# Patient Record
Sex: MALE | Race: WHITE | Employment: FULL TIME | URBAN - METROPOLITAN AREA
[De-identification: names, ages, dates, MRNs, and addresses within clinical notes are randomized per-mention and may not be internally consistent; named-entity substitution may affect disease eponyms.]

---

## 2018-09-07 ENCOUNTER — OFFICE VISIT (OUTPATIENT)
Dept: FAMILY MEDICINE | Facility: CLINIC | Age: 48
End: 2018-09-07
Payer: COMMERCIAL

## 2018-09-07 VITALS
SYSTOLIC BLOOD PRESSURE: 107 MMHG | RESPIRATION RATE: 18 BRPM | HEIGHT: 69 IN | WEIGHT: 207.6 LBS | HEART RATE: 50 BPM | BODY MASS INDEX: 30.75 KG/M2 | DIASTOLIC BLOOD PRESSURE: 70 MMHG | OXYGEN SATURATION: 98 % | TEMPERATURE: 97.5 F

## 2018-09-07 DIAGNOSIS — Z13.6 CARDIOVASCULAR SCREENING; LDL GOAL LESS THAN 160: ICD-10-CM

## 2018-09-07 DIAGNOSIS — Z00.00 ROUTINE GENERAL MEDICAL EXAMINATION AT A HEALTH CARE FACILITY: Primary | ICD-10-CM

## 2018-09-07 PROCEDURE — 36415 COLL VENOUS BLD VENIPUNCTURE: CPT | Performed by: NURSE PRACTITIONER

## 2018-09-07 PROCEDURE — 80048 BASIC METABOLIC PNL TOTAL CA: CPT | Performed by: NURSE PRACTITIONER

## 2018-09-07 PROCEDURE — 99386 PREV VISIT NEW AGE 40-64: CPT | Performed by: NURSE PRACTITIONER

## 2018-09-07 PROCEDURE — 80061 LIPID PANEL: CPT | Performed by: NURSE PRACTITIONER

## 2018-09-07 NOTE — MR AVS SNAPSHOT
After Visit Summary   9/7/2018    Clinton Jim    MRN: [id number]           Patient Information     Date Of Birth          1970        Visit Information        Provider Department      9/7/2018 1:00 PM Elodia Rogel APRN [facility]        Today's Diagnoses     Routine general medical examination at a health care facility    -  1    CARDIOVASCULAR SCREENING; LDL GOAL LESS THAN 160          Care Instructions      Preventive Health Recommendations  Male Ages 40 to 49    Yearly exam:             See your health care provider every year in order to  o   Review health changes.   o   Discuss preventive care.    o   Review your medicines if your doctor has prescribed any.    You should be tested each year for STDs (sexually transmitted diseases) if you re at risk.     Have a cholesterol test every 5 years.     Have a colonoscopy (test for colon cancer) if someone in your family has had colon cancer or polyps before age 50.     After age 45, have a diabetes test (fasting glucose). If you are at risk for diabetes, you should have this test every 3 years.      Talk with your health care provider about whether or not a prostate cancer screening test (PSA) is right for you.    Shots: Get a flu shot each year. Get a tetanus shot every 10 years.     Nutrition:    Eat at least 5 servings of fruits and vegetables daily.     Eat whole-grain bread, whole-wheat pasta and brown rice instead of white grains and rice.     Get adequate Calcium and Vitamin D.     Lifestyle    Exercise for at least 150 minutes a week (30 minutes a day, 5 days a week). This will help you control your weight and prevent disease.     Limit alcohol to one drink per day.     No smoking.     Wear sunscreen to prevent skin cancer.     See your dentist every six months for an exam and cleaning.        Preventive Health Recommendations  Male Ages 40 to 49    Yearly exam:             See your health care provider every  year in order to  o   Review health changes.   o   Discuss preventive care.    o   Review your medicines if your doctor has prescribed any.    You should be tested each year for STDs (sexually transmitted diseases) if you re at risk.     Have a cholesterol test every 5 years.     Have a colonoscopy (test for colon cancer) if someone in your family has had colon cancer or polyps before age 50.     After age 45, have a diabetes test (fasting glucose). If you are at risk for diabetes, you should have this test every 3 years.      Talk with your health care provider about whether or not a prostate cancer screening test (PSA) is right for you.    Shots: Get a flu shot each year. Get a tetanus shot every 10 years.     Nutrition:    Eat at least 5 servings of fruits and vegetables daily.     Eat whole-grain bread, whole-wheat pasta and brown rice instead of white grains and rice.     Get adequate Calcium and Vitamin D.     Lifestyle    Exercise for at least 150 minutes a week (30 minutes a day, 5 days a week). This will help you control your weight and prevent disease.     Limit alcohol to one drink per day.     No smoking.     Wear sunscreen to prevent skin cancer.     See your dentist every six months for an exam and cleaning.              Follow-ups after your visit        Follow-up notes from your care team     Return in about 1 year (around 9/7/2019) for Physical Exam.      Who to contact     If you have questions or need follow up information about today's clinic visit or your schedule please contact [facility] directly at [phone].  Normal or non-critical lab and imaging results will be communicated to you by MyChart, letter or phone within 4 business days after the clinic has received the results. If you do not hear from us within 7 days, please contact the clinic through MyChart or phone. If you have a critical or abnormal lab result, we will notify you by phone as soon as possible.  Submit  "refill requests through [employer] or call your pharmacy and they will forward the refill request to us. Please allow 3 business days for your refill to be completed.          Additional Information About Your Visit        [employer] Information     [employer] gives you secure access to your electronic health record. If you see a primary care provider, you can also send messages to your care team and make appointments. If you have questions, please call your primary care clinic.  If you do not have a primary care provider, please call [phone] and they will assist you.        Care EveryWhere ID     This is your Care EveryWhere ID. This could be used by other organizations to access your [facility] medical records  [id number]        Your Vitals Were     Pulse Temperature Respirations Height Pulse Oximetry BMI (Body Mass Index)    50 97.5  F (36.4  C) (Oral) 18 5' 8.5\" (1.74 m) 98% 31.11 kg/m2       Blood Pressure from Last 3 Encounters:   09/07/18 107/70   03/12/14 108/76   10/10/11 112/70    Weight from Last 3 Encounters:   09/07/18 207 lb 9.6 oz (94.2 kg)   03/12/14 213 lb (96.6 kg)   10/10/11 205 lb 4.8 oz (93.1 kg)              We Performed the Following     Basic metabolic panel     Lipid panel reflex to direct LDL Fasting        Primary Care Provider Office Phone # Fax #    Case Marco A Howell [phone]       [phone]  [facility] [id number]        Equal Access to Services     [facility]: Hadii aad ku hadasho Soomaali, waaxda luqadaha, qaybta kaalmada billyegyada, bernarda moon . So [facility] [phone].    ATENCIÓN: Si habla español, tiene a galvan disposición servicios gratuitos de asistencia lingüística. Llame al [phone].    We comply with applicable federal civil rights laws and [place] laws. We do not discriminate on the basis of race, color, national origin, age, disability, sex, sexual orientation, or gender identity.            Thank you!     Thank you " for choosing [facility]  for your care. Our goal is always to provide you with excellent care. Hearing back from our patients is one way we can continue to improve our services. Please take a few minutes to complete the written survey that you may receive in the mail after your visit with us. Thank you!             Your Updated Medication List - Protect others around you: Learn how to safely use, store and throw away your medicines at www.disposemymeds.org.      Notice  As of 9/7/2018  1:24 PM    You have not been prescribed any medications.

## 2018-09-07 NOTE — PROGRESS NOTES
SUBJECTIVE:   CC: Clinton Jim is an 48 year old male who presents for preventative health visit.     Physical   Annual:     Getting at least 3 servings of Calcium per day:  Yes    Bi-annual eye exam:  Yes    Dental care twice a year:  Yes    Sleep apnea or symptoms of sleep apnea:  None    Diet:  Regular (no restrictions)    Frequency of exercise:  4-5 days/week    Duration of exercise:  45-60 minutes    Taking medications regularly:  Yes    Medication side effects:  None    Additional concerns today:  No      Overall doing well. No complaints or concerns about his health today.  Declines flu vaccine.       Today's PHQ-2 Score:   PHQ-2 ( 1999 Pfizer) 9/7/2018   Q1: Little interest or pleasure in doing things 0   Q2: Feeling down, depressed or hopeless 0   PHQ-2 Score 0   Q1: Little interest or pleasure in doing things Not at all   Q2: Feeling down, depressed or hopeless Not at all   PHQ-2 Score 0       Abuse: Current or Past(Physical, Sexual or Emotional)- No  Do you feel safe in your environment - Yes    Social History   Substance Use Topics     Smoking status: Never Smoker     Smokeless tobacco: Never Used     Alcohol use Yes      Comment: occasionally - couple/m     Alcohol Use 9/7/2018   If you drink alcohol do you typically have greater than 3 drinks per day OR greater than 7 drinks per week? No   No flowsheet data found.    Last PSA: No results found for: PSA    Reviewed orders with patient. Reviewed health maintenance and updated orders accordingly - Yes  BP Readings from Last 3 Encounters:   09/07/18 107/70   03/12/14 108/76   10/10/11 112/70    Wt Readings from Last 3 Encounters:   09/07/18 207 lb 9.6 oz (94.2 kg)   03/12/14 213 lb (96.6 kg)   10/10/11 205 lb 4.8 oz (93.1 kg)                  No current outpatient prescriptions on file.     Allergies   Allergen Reactions     No Known Allergies        Reviewed and updated as needed this visit by clinical staff  Tobacco  Allergies  Meds         Reviewed  "and updated as needed this visit by Provider        No past medical history on file.   No past surgical history on file.    Review of Systems  CONSTITUTIONAL: NEGATIVE for fever, chills, change in weight  INTEGUMENTARY/SKIN: NEGATIVE for worrisome rashes, moles or lesions  EYES: NEGATIVE for vision changes or irritation  ENT: NEGATIVE for ear, mouth and throat problems  RESP: NEGATIVE for significant cough or SOB  CV: NEGATIVE for chest pain, palpitations or peripheral edema  GI: NEGATIVE for nausea, abdominal pain, heartburn, or change in bowel habits   male: negative for dysuria, hematuria, decreased urinary stream, erectile dysfunction, urethral discharge  MUSCULOSKELETAL: NEGATIVE for significant arthralgias or myalgia  NEURO: NEGATIVE for weakness, dizziness or paresthesias  PSYCHIATRIC: NEGATIVE for changes in mood or affect    OBJECTIVE:   /70 (BP Location: Right arm, Patient Position: Chair, Cuff Size: Adult Regular)  Pulse 50  Temp 97.5  F (36.4  C) (Oral)  Resp 18  Ht 5' 8.5\" (1.74 m)  Wt 207 lb 9.6 oz (94.2 kg)  SpO2 98%  BMI 31.11 kg/m2    Physical Exam  GENERAL: healthy, alert and no distress  EYES: Eyes grossly normal to inspection, PERRL and conjunctivae and sclerae normal  HENT: ear canals and TM's normal, nose and mouth without ulcers or lesions  NECK: no adenopathy, no asymmetry, masses, or scars and thyroid normal to palpation  RESP: lungs clear to auscultation - no rales, rhonchi or wheezes  CV: regular rate and rhythm, normal S1 S2, no S3 or S4, no murmur, click or rub, no peripheral edema and peripheral pulses strong  ABDOMEN: soft, nontender, no hepatosplenomegaly, no masses and bowel sounds normal  MS: no gross musculoskeletal defects noted, no edema  SKIN: no suspicious lesions or rashes  NEURO: Normal strength and tone, mentation intact and speech normal  PSYCH: mentation appears normal, affect normal/bright    Diagnostic Test Results:  none     ASSESSMENT/PLAN:   1. Routine " "general medical examination at a health care facility  Normal exam today.  Will be due for colonoscopy next year.    - Lipid panel reflex to direct LDL Fasting  - Basic metabolic panel    2. CARDIOVASCULAR SCREENING; LDL GOAL LESS THAN 160  - Lipid panel reflex to direct LDL Fasting    COUNSELING:   Reviewed preventive health counseling, as reflected in patient instructions       Regular exercise       Healthy diet/nutrition    BP Readings from Last 1 Encounters:   09/07/18 107/70     Estimated body mass index is 31.11 kg/(m^2) as calculated from the following:    Height as of this encounter: 5' 8.5\" (1.74 m).    Weight as of this encounter: 207 lb 9.6 oz (94.2 kg).      Weight management plan: Discussed healthy diet and exercise guidelines and patient will follow up in 12 months in clinic to re-evaluate.     reports that he has never smoked. He has never used smokeless tobacco.      Counseling Resources:  ATP IV Guidelines  Pooled Cohorts Equation Calculator  FRAX Risk Assessment  ICSI Preventive Guidelines  Dietary Guidelines for Americans, 2010  USDA's MyPlate  ASA Prophylaxis  Lung CA Screening    [facility]  Answers for HPI/ROS submitted by the patient on 9/7/2018   PHQ-2 Score: 0    "

## 2018-09-07 NOTE — PROGRESS NOTES
"  SUBJECTIVE:   CC: Clinton Jim is an 48 year old male who presents for preventative health visit.     Healthy Habits:    Do you get at least three servings of calcium containing foods daily (dairy, green leafy vegetables, etc.)? {YES/NO, DAIRY INTAKE:578813::\"yes\"}    Amount of exercise or daily activities, outside of work: {AMOUNT EXERCISE:912930}    Problems taking medications regularly {Yes /No default:644759::\"No\"}    Medication side effects: {Yes /No default.:095108::\"No\"}    Have you had an eye exam in the past two years? {YESNOBLANK:785556}    Do you see a dentist twice per year? {YESNOBLANK:736174}    Do you have sleep apnea, excessive snoring or daytime drowsiness?{YESNOBLANK:003980}  {Outside tests to abstract? :449376}     {additional problems to add (Optional):942290}    Today's PHQ-2 Score:   PHQ-2 ( 1999 Pfizer) 3/12/2014 10/10/2011   Q1: Little interest or pleasure in doing things 0 0   Q2: Feeling down, depressed or hopeless 0 0   PHQ-2 Score 0 0     {PHQ-2 LOOK IN ASSESSMENTS :114712}  Abuse: Current or Past(Physical, Sexual or Emotional)- {YES/NO/NA:720439}  Do you feel safe in your environment - {YES/NO/NA:015620}    Social History   Substance Use Topics     Smoking status: Never Smoker     Smokeless tobacco: Never Used     Alcohol use Yes      Comment: occasionally - couple/m      If you drink alcohol do you typically have >3 drinks per day or >7 drinks per week? {ETOH :789692}                      Last PSA: No results found for: PSA    Reviewed orders with patient. Reviewed health maintenance and updated orders accordingly - {Yes/No:085209::\"Yes\"}  {Chronicprobdata (Optional):782634}    Reviewed and updated as needed this visit by clinical staff         Reviewed and updated as needed this visit by Provider        {HISTORY OPTIONS (Optional):161213}    ROS:  { :903191::\"CONSTITUTIONAL: NEGATIVE for fever, chills, change in weight\",\"INTEGUMENTARY/SKIN: NEGATIVE for worrisome rashes, moles or " "lesions\",\"EYES: NEGATIVE for vision changes or irritation\",\"ENT: NEGATIVE for ear, mouth and throat problems\",\"RESP: NEGATIVE for significant cough or SOB\",\"CV: NEGATIVE for chest pain, palpitations or peripheral edema\",\"GI: NEGATIVE for nausea, abdominal pain, heartburn, or change in bowel habits\",\" male: negative for dysuria, hematuria, decreased urinary stream, erectile dysfunction, urethral discharge\",\"MUSCULOSKELETAL: NEGATIVE for significant arthralgias or myalgia\",\"NEURO: NEGATIVE for weakness, dizziness or paresthesias\",\"PSYCHIATRIC: NEGATIVE for changes in mood or affect\"}    OBJECTIVE:   There were no vitals taken for this visit.  EXAM:  {Exam Choices:441945}    {Diagnostic Test Results (Optional):045222::\"Diagnostic Test Results:\",\"none \"}    ASSESSMENT/PLAN:   {Diag Picklist:685356}    COUNSELING:  {MALE COUNSELING MESSAGES:786310::\"Reviewed preventive health counseling, as reflected in patient instructions\"}    BP Readings from Last 1 Encounters:   03/12/14 108/76     Estimated body mass index is 31 kg/(m^2) as calculated from the following:    Height as of 3/12/14: 5' 9.5\" (1.765 m).    Weight as of 3/12/14: 213 lb (96.6 kg).    {BP Counseling- Complete if BP >= 120/80  (Optional):137861}  {Weight Management Plan (ACO) Complete if BMI is abnormal-  Ages 18-64  BMI >24.9.  Age 65+ with BMI <23 or >30 (Optional):224542}     reports that he has never smoked. He has never used smokeless tobacco.  {Tobacco Cessation -- Complete if patient is a smoker (Optional):051470}    Counseling Resources:  ATP IV Guidelines  Pooled Cohorts Equation Calculator  FRAX Risk Assessment  ICSI Preventive Guidelines  Dietary Guidelines for Americans, 2010  USDA's MyPlate  ASA Prophylaxis  Lung CA Screening    Elodia Rogel, [facility]  "

## 2018-09-08 LAB
ANION GAP SERPL CALCULATED.3IONS-SCNC: 11 MMOL/L (ref 3–14)
BUN SERPL-MCNC: 9 MG/DL (ref 7–30)
CALCIUM SERPL-MCNC: 8.8 MG/DL (ref 8.5–10.1)
CHLORIDE SERPL-SCNC: 106 MMOL/L (ref 94–109)
CHOLEST SERPL-MCNC: 173 MG/DL
CO2 SERPL-SCNC: 24 MMOL/L (ref 20–32)
CREAT SERPL-MCNC: 0.85 MG/DL (ref 0.66–1.25)
GFR SERPL CREATININE-BSD FRML MDRD: >90 ML/MIN/1.7M2
GLUCOSE SERPL-MCNC: 77 MG/DL (ref 70–99)
HDLC SERPL-MCNC: 58 MG/DL
LDLC SERPL CALC-MCNC: 105 MG/DL
NONHDLC SERPL-MCNC: 115 MG/DL
POTASSIUM SERPL-SCNC: 4.4 MMOL/L (ref 3.4–5.3)
SODIUM SERPL-SCNC: 141 MMOL/L (ref 133–144)
TRIGL SERPL-MCNC: 48 MG/DL

## 2019-09-26 ENCOUNTER — OFFICE VISIT (OUTPATIENT)
Dept: FAMILY MEDICINE | Facility: CLINIC | Age: 49
End: 2019-09-26
Payer: COMMERCIAL

## 2019-09-26 VITALS
SYSTOLIC BLOOD PRESSURE: 120 MMHG | WEIGHT: 206 LBS | DIASTOLIC BLOOD PRESSURE: 84 MMHG | RESPIRATION RATE: 12 BRPM | OXYGEN SATURATION: 99 % | HEART RATE: 52 BPM | BODY MASS INDEX: 30.51 KG/M2 | TEMPERATURE: 97.7 F | HEIGHT: 69 IN

## 2019-09-26 DIAGNOSIS — M25.512 ACUTE PAIN OF LEFT SHOULDER: ICD-10-CM

## 2019-09-26 DIAGNOSIS — Z23 NEED FOR PROPHYLACTIC VACCINATION AND INOCULATION AGAINST INFLUENZA: ICD-10-CM

## 2019-09-26 DIAGNOSIS — Z23 NEED FOR TDAP VACCINATION: ICD-10-CM

## 2019-09-26 DIAGNOSIS — Z11.3 SCREEN FOR STD (SEXUALLY TRANSMITTED DISEASE): ICD-10-CM

## 2019-09-26 DIAGNOSIS — Z00.00 ROUTINE GENERAL MEDICAL EXAMINATION AT A HEALTH CARE FACILITY: Primary | ICD-10-CM

## 2019-09-26 DIAGNOSIS — Z12.11 SPECIAL SCREENING FOR MALIGNANT NEOPLASMS, COLON: ICD-10-CM

## 2019-09-26 PROCEDURE — 87591 N.GONORRHOEAE DNA AMP PROB: CPT | Performed by: FAMILY MEDICINE

## 2019-09-26 PROCEDURE — 90715 TDAP VACCINE 7 YRS/> IM: CPT | Performed by: FAMILY MEDICINE

## 2019-09-26 PROCEDURE — 80048 BASIC METABOLIC PNL TOTAL CA: CPT | Performed by: FAMILY MEDICINE

## 2019-09-26 PROCEDURE — 80061 LIPID PANEL: CPT | Performed by: FAMILY MEDICINE

## 2019-09-26 PROCEDURE — 87389 HIV-1 AG W/HIV-1&-2 AB AG IA: CPT | Performed by: FAMILY MEDICINE

## 2019-09-26 PROCEDURE — 36415 COLL VENOUS BLD VENIPUNCTURE: CPT | Performed by: FAMILY MEDICINE

## 2019-09-26 PROCEDURE — 90471 IMMUNIZATION ADMIN: CPT | Performed by: FAMILY MEDICINE

## 2019-09-26 PROCEDURE — 99396 PREV VISIT EST AGE 40-64: CPT | Mod: 25 | Performed by: FAMILY MEDICINE

## 2019-09-26 PROCEDURE — 86780 TREPONEMA PALLIDUM: CPT | Performed by: FAMILY MEDICINE

## 2019-09-26 PROCEDURE — 87491 CHLMYD TRACH DNA AMP PROBE: CPT | Performed by: FAMILY MEDICINE

## 2019-09-26 PROCEDURE — 90682 RIV4 VACC RECOMBINANT DNA IM: CPT | Performed by: FAMILY MEDICINE

## 2019-09-26 PROCEDURE — 90472 IMMUNIZATION ADMIN EACH ADD: CPT | Performed by: FAMILY MEDICINE

## 2019-09-26 ASSESSMENT — ENCOUNTER SYMPTOMS
DIARRHEA: 0
FEVER: 0
COUGH: 0
ARTHRALGIAS: 1
EYE PAIN: 0
DIZZINESS: 0
HEMATURIA: 0
FREQUENCY: 0
HEMATOCHEZIA: 0
CONSTIPATION: 0
NERVOUS/ANXIOUS: 0
ABDOMINAL PAIN: 0
CHILLS: 0

## 2019-09-26 ASSESSMENT — MIFFLIN-ST. JEOR: SCORE: 1776.85

## 2019-09-26 NOTE — PROGRESS NOTES
Prior to immunization administration, verified patients identity using patient s name and date of birth. Please see Immunization Activity for additional information.     Screening Questionnaire for Adult Immunization    Are you sick today?   No   Do you have allergies to medications, food, a vaccine component or latex?   No   Have you ever had a serious reaction after receiving a vaccination?   No   Do you have a long-term health problem with heart disease, lung disease, asthma, kidney disease, metabolic disease (e.g. diabetes), anemia, or other blood disorder?   No   Do you have cancer, leukemia, HIV/AIDS, or any other immune system problem?   No   In the past 3 months, have you taken medications that affect  your immune system, such as prednisone, other steroids, or anticancer drugs; drugs for the treatment of rheumatoid arthritis, Crohn s disease, or psoriasis; or have you had radiation treatments?   No   Have you had a seizure, or a brain or other nervous system problem?   No   During the past year, have you received a transfusion of blood or blood     products, or been given immune (gamma) globulin or antiviral drug?   No   For women: Are you pregnant or is there a chance you could become        pregnant during the next month?   No   Have you received any vaccinations in the past 4 weeks?   No     Immunization questionnaire answers were all negative.        Per orders of Dr. Howell, injection of Tdap and Flu given by Lisa A. Magill, CMA. Patient instructed to remain in clinic for 15 minutes afterwards, and to report any adverse reaction to me immediately.       Screening performed by Lisa A. Magill, CMA on 9/26/2019 at 9:33 AM.

## 2019-09-26 NOTE — PROGRESS NOTES
SUBJECTIVE:   CC: Clinton Jim is an 49 year old male who presents for preventative health visit.     Healthy Habits:     Getting at least 3 servings of Calcium per day:  Yes    Bi-annual eye exam:  Yes    Dental care twice a year:  Yes    Sleep apnea or symptoms of sleep apnea:  None    Diet:  Regular (no restrictions)    Frequency of exercise:  4-5 days/week    Barriers to taking medications:  None    Medication side effects:  None    PHQ-2 Total Score: 0    Additional concerns today:  No        Today's PHQ-2 Score:   PHQ-2 ( 1999 Pfizer) 9/26/2019   Q1: Little interest or pleasure in doing things 0   Q2: Feeling down, depressed or hopeless 0   PHQ-2 Score 0   Q1: Little interest or pleasure in doing things Not at all   Q2: Feeling down, depressed or hopeless Not at all   PHQ-2 Score 0       Abuse: Current or Past(Physical, Sexual or Emotional)- No  Do you feel safe in your environment? Yes    Social History     Tobacco Use     Smoking status: Never Smoker     Smokeless tobacco: Never Used   Substance Use Topics     Alcohol use: Yes     Comment: occasionally - couple/m       Alcohol Use 9/26/2019   Prescreen: >3 drinks/day or >7 drinks/week? No   Prescreen: >3 drinks/day or >7 drinks/week? -   No flowsheet data found.    Last PSA: No results found for: PSA    Reviewed orders with patient. Reviewed health maintenance and updated orders accordingly - Yes  Lab work is in process    Feeling well, no acute concerns.  Is fasting.  Would like STD testing, no sx but is currently dating.  Has been at least an hour since he urinated last.    Pain in L shoulder when bench pressing when he exercises.  Linked to an injury many years ago. Mostly better, really just this one movement that bothers him.  No pain in between.    Small skin tag on R neck.      Erection concerns.  Seems less robust, but functional.  Will still wake with an erection.    Reviewed and updated as needed this visit by clinical staff  Tobacco  Allergies   "Meds  Med Hx  Surg Hx  Fam Hx  Soc Hx        Reviewed and updated as needed this visit by Provider            Review of Systems   Musculoskeletal: Positive for arthralgias.     CONSTITUTIONAL: NEGATIVE for fever, chills, change in weight  INTEGUMENTARY/SKIN: NEGATIVE for worrisome rashes, moles or lesions  EYES: NEGATIVE for vision changes or irritation  ENT: NEGATIVE for ear, mouth and throat problems  RESP: NEGATIVE for significant cough or SOB  CV: NEGATIVE for chest pain, palpitations or peripheral edema  GI: NEGATIVE for nausea, abdominal pain, heartburn, or change in bowel habits   male: negative for dysuria, hematuria, decreased urinary stream, erectile dysfunction, urethral discharge  MUSCULOSKELETAL: NEGATIVE for significant arthralgias or myalgia  NEURO: NEGATIVE for weakness, dizziness or paresthesias  PSYCHIATRIC: NEGATIVE for changes in mood or affect    OBJECTIVE:   /84 (BP Location: Right arm, Patient Position: Chair, Cuff Size: Adult Large)   Pulse 52   Temp 97.7  F (36.5  C) (Oral)   Resp 12   Ht 1.74 m (5' 8.5\")   Wt 93.4 kg (206 lb)   SpO2 99%   BMI 30.87 kg/m      Physical Exam  Vitals signs and nursing note reviewed.   Constitutional:       Appearance: He is well-developed.   HENT:      Head: Normocephalic and atraumatic.      Right Ear: Tympanic membrane, ear canal and external ear normal.      Left Ear: Tympanic membrane, ear canal and external ear normal.   Eyes:      Pupils: Pupils are equal, round, and reactive to light.   Neck:      Thyroid: No thyromegaly.   Cardiovascular:      Rate and Rhythm: Normal rate and regular rhythm.      Heart sounds: Normal heart sounds.   Pulmonary:      Effort: Pulmonary effort is normal.      Breath sounds: Normal breath sounds.   Abdominal:      General: Bowel sounds are normal.      Palpations: Abdomen is soft. There is no mass.   Musculoskeletal: Normal range of motion.      Left shoulder: He exhibits normal range of motion, no " "tenderness, no crepitus and normal strength.   Lymphadenopathy:      Cervical: No cervical adenopathy.   Skin:     General: Skin is warm and dry.      Findings: No rash.   Neurological:      Mental Status: He is alert and oriented to person, place, and time.   Psychiatric:         Judgement: Judgment normal.           Diagnostic Test Results:  Labs reviewed in Epic    ASSESSMENT/PLAN:       ICD-10-CM    1. Routine general medical examination at a health care facility Z00.00 Lipid panel reflex to direct LDL Fasting     Basic metabolic panel   2. Screen for STD (sexually transmitted disease) Z11.3 NEISSERIA GONORRHOEA PCR     CHLAMYDIA TRACHOMATIS PCR     Treponema Abs w Reflex to RPR and Titer     HIV Antigen Antibody Combo   3. Special screening for malignant neoplasms, colon Z12.11 GASTROENTEROLOGY ADULT REF PROCEDURE ONLY Radha Arellano [phone]; [facility]   4. Acute pain of left shoulder M25.512 [facility] PT[facility] HAND, AND CHIROPRACTIC REFERRAL       COUNSELING:   Reviewed preventive health counseling, as reflected in patient instructions       Regular exercise       Vision screening    Estimated body mass index is 30.87 kg/m  as calculated from the following:    Height as of this encounter: 1.74 m (5' 8.5\").    Weight as of this encounter: 93.4 kg (206 lb).     Weight management plan: Discussed healthy diet and exercise guidelines     reports that he has never smoked. He has never used smokeless tobacco.      Counseling Resources:  ATP IV Guidelines  Pooled Cohorts Equation Calculator  FRAX Risk Assessment  ICSI Preventive Guidelines  Dietary Guidelines for Americans, 2010  USDA's MyPlate  ASA Prophylaxis  Lung CA Screening    Case Howell MD  [facility]  "

## 2019-09-27 ENCOUNTER — THERAPY VISIT (OUTPATIENT)
Dept: PHYSICAL THERAPY | Facility: CLINIC | Age: 49
End: 2019-09-27
Payer: COMMERCIAL

## 2019-09-27 ENCOUNTER — HOSPITAL ENCOUNTER (OUTPATIENT)
Facility: CLINIC | Age: 49
End: 2019-09-27
Attending: SURGERY | Admitting: SURGERY
Payer: COMMERCIAL

## 2019-09-27 DIAGNOSIS — M25.512 CHRONIC LEFT SHOULDER PAIN: ICD-10-CM

## 2019-09-27 DIAGNOSIS — M25.512 ACUTE PAIN OF LEFT SHOULDER: ICD-10-CM

## 2019-09-27 DIAGNOSIS — G89.29 CHRONIC LEFT SHOULDER PAIN: ICD-10-CM

## 2019-09-27 LAB
ANION GAP SERPL CALCULATED.3IONS-SCNC: 4 MMOL/L (ref 3–14)
BUN SERPL-MCNC: 11 MG/DL (ref 7–30)
C TRACH DNA SPEC QL NAA+PROBE: NEGATIVE
CALCIUM SERPL-MCNC: 9.1 MG/DL (ref 8.5–10.1)
CHLORIDE SERPL-SCNC: 104 MMOL/L (ref 94–109)
CHOLEST SERPL-MCNC: 197 MG/DL
CO2 SERPL-SCNC: 28 MMOL/L (ref 20–32)
CREAT SERPL-MCNC: 0.9 MG/DL (ref 0.66–1.25)
GFR SERPL CREATININE-BSD FRML MDRD: >90 ML/MIN/{1.73_M2}
GLUCOSE SERPL-MCNC: 85 MG/DL (ref 70–99)
HDLC SERPL-MCNC: 60 MG/DL
HIV 1+2 AB+HIV1 P24 AG SERPL QL IA: NONREACTIVE
LDLC SERPL CALC-MCNC: 127 MG/DL
N GONORRHOEA DNA SPEC QL NAA+PROBE: NEGATIVE
NONHDLC SERPL-MCNC: 137 MG/DL
POTASSIUM SERPL-SCNC: 4.5 MMOL/L (ref 3.4–5.3)
SODIUM SERPL-SCNC: 136 MMOL/L (ref 133–144)
SPECIMEN SOURCE: NORMAL
SPECIMEN SOURCE: NORMAL
T PALLIDUM AB SER QL: NONREACTIVE
TRIGL SERPL-MCNC: 49 MG/DL

## 2019-09-27 PROCEDURE — 97161 PT EVAL LOW COMPLEX 20 MIN: CPT | Mod: GP | Performed by: PHYSICAL THERAPIST

## 2019-09-27 PROCEDURE — 97110 THERAPEUTIC EXERCISES: CPT | Mod: GP | Performed by: PHYSICAL THERAPIST

## 2019-09-27 NOTE — PROGRESS NOTES
[facility] Initial Evaluation  Subjective:  The history is provided by the patient. No  was used.   Clinton Jim being seen for L shoulder pain.   Date of Onset: greater than one year ago. Where condition occurred: during recreation / sport.Problem occurred: lifting weights  and reported as 2/10 on pain scale. General health as reported by patient is good. Pertinent medical history includes:  None.  Medical allergies: none.  Surgeries include:  None.  Current medications:  None.   Primary job tasks include:  Computer work.  Pain is described as aching and is intermittent. Pain is worse during the day. Since onset symptoms are unchanged.   There was none improvement following previous treatment.   Patient is .   Barriers include:  None as reported by patient.  Red flags:  None as reported by patient.  Type of problem:  Left shoulder   Condition occurred with:  Lifting. This is a chronic condition   Problem details: Pt reports having left shoulder pain that has been present for greater than one year.  He attributes onset of sx's to lifting weights and performing low rep/high weight program.  Md appt on 9/26/19.  .   Patient reports pain:  In the joint and upper arm. Radiates to:  Shoulder. Associated symptoms:  Loss of motion/stiffness and loss of strength. Symptoms are exacerbated by using arm behind back and using arm overhead and relieved by rest.                      Objective:  Standing Alignment:      Shoulder/UE:  Rounded shoulders                  Flexibility/Screens:   Positive screens:  Shoulder                             Shoulder Evaluation:  ROM:  AROM:  normal                            PROM:  normal                                Strength:    Flexion: Left:4/5    Pain: -/+    Right: 5/5     Pain:   Extension:  Left: 5/5    Pain:    Right: 5/5    Pain:  Abduction:  Left: 4/5   Pain:-/+    Right: 4 and 5/5     Pain:    Internal Rotation:  Left:5/5      Pain:-     Right: 5/5      Pain:-  External Rotation:   Left:4/5      Pain:-/+   Right:5/5      Pain:-            Stability Testing:  normal      Special Tests:    Left shoulder positive for the following special tests:  Impingement  Left shoulder negative for the following special tests:  Labral and Rotator cuff tear    Right shoulder negative for the following special tests:Labral; Impingement and Rotator cuff tear  Palpation:  normal      Mobility Tests:  normal    Glenohumeral posterior left:  Hypomobile  Glenohumeral posterior right:  Normal                                             General     ROS    Assessment/Plan:    Patient is a 49 year old male with left side shoulder complaints.    Patient has the following significant findings with corresponding treatment plan.                Diagnosis 1:  L shoulder pain  Pain -  hot/cold therapy, self management, education, directional preference exercise and home program  Decreased strength - therapeutic exercise and therapeutic activities  Impaired muscle performance - neuro re-education  Decreased function - therapeutic activities    Therapy Evaluation Codes:   Previous and current functional limitations:  (See Goal Flow Sheet for this information)    Short term and Long term goals: (See Goal Flow Sheet for this information)     Communication ability:  Patient appears to be able to clearly communicate and understand verbal and written communication and follow directions correctly.  Treatment Explanation - The following has been discussed with the patient:   RX ordered/plan of care  Anticipated outcomes  Possible risks and side effects  This patient would benefit from PT intervention to resume normal activities.   Rehab potential is good.    Frequency:  1 X week, once daily  Duration:  for 6 weeks  Discharge Plan:  Achieve all LTG.  Independent in home treatment program.  Reach maximal therapeutic benefit.    Please refer to the daily flowsheet for treatment today, total  treatment time and time spent performing 1:1 timed codes.

## 2019-10-22 RX ORDER — ONDANSETRON 2 MG/ML
4 INJECTION INTRAMUSCULAR; INTRAVENOUS
Status: CANCELLED | OUTPATIENT
Start: 2019-10-22

## 2019-10-22 RX ORDER — LIDOCAINE 40 MG/G
CREAM TOPICAL
Status: CANCELLED | OUTPATIENT
Start: 2019-10-22

## 2019-10-31 PROBLEM — M25.512 CHRONIC LEFT SHOULDER PAIN: Status: RESOLVED | Noted: 2019-09-27 | Resolved: 2019-10-31

## 2019-10-31 PROBLEM — G89.29 CHRONIC LEFT SHOULDER PAIN: Status: RESOLVED | Noted: 2019-09-27 | Resolved: 2019-10-31

## 2019-11-21 ENCOUNTER — HOSPITAL ENCOUNTER (OUTPATIENT)
Facility: CLINIC | Age: 49
End: 2019-11-21
Attending: SURGERY | Admitting: SURGERY
Payer: COMMERCIAL

## 2019-12-18 RX ORDER — LIDOCAINE 40 MG/G
CREAM TOPICAL
Status: CANCELLED | OUTPATIENT
Start: 2019-12-18

## 2019-12-18 RX ORDER — ONDANSETRON 2 MG/ML
4 INJECTION INTRAMUSCULAR; INTRAVENOUS
Status: CANCELLED | OUTPATIENT
Start: 2019-12-18

## 2020-12-06 ENCOUNTER — HEALTH MAINTENANCE LETTER (OUTPATIENT)
Age: 50
End: 2020-12-06

## 2021-06-08 ENCOUNTER — OFFICE VISIT (OUTPATIENT)
Dept: FAMILY MEDICINE | Facility: CLINIC | Age: 51
End: 2021-06-08
Payer: COMMERCIAL

## 2021-06-08 VITALS
SYSTOLIC BLOOD PRESSURE: 126 MMHG | BODY MASS INDEX: 31.92 KG/M2 | HEIGHT: 68 IN | HEART RATE: 66 BPM | TEMPERATURE: 98.7 F | DIASTOLIC BLOOD PRESSURE: 66 MMHG | WEIGHT: 210.6 LBS | OXYGEN SATURATION: 96 %

## 2021-06-08 DIAGNOSIS — L91.8 SKIN TAG: ICD-10-CM

## 2021-06-08 DIAGNOSIS — L98.9 SKIN LESION: Primary | ICD-10-CM

## 2021-06-08 PROCEDURE — 99213 OFFICE O/P EST LOW 20 MIN: CPT | Performed by: FAMILY MEDICINE

## 2021-06-08 ASSESSMENT — MIFFLIN-ST. JEOR: SCORE: 1789.65

## 2021-06-08 NOTE — PROGRESS NOTES
"    Assessment & Plan     Skin lesion  Actinic Keratosis vs Squamous Cell on right temple.  Recommend biopsy.  Referral to Derm.  - ADULT DERMATOLOGY REFERRAL; Future    Skin tag  Pt will get removed when lesion on temple is removed.  - ADULT DERMATOLOGY REFERRAL; Future    7 minutes spent on the date of the encounter doing chart review, history and exam, documentation and further activities per the note     BMI:   Estimated body mass index is 31.73 kg/m  as calculated from the following:    Height as of this encounter: 1.735 m (5' 8.31\").    Weight as of this encounter: 95.5 kg (210 lb 9.6 oz).       See Patient Instructions    Return in about 3 months (around 9/8/2021) for Preventative Care Visit.    Linette Shafer MD  [facility] KALYANI Pace is a 50 year old who presents for the following health issues   History of Present Illness       He eats 2-3 servings of fruits and vegetables daily.He consumes 0 sweetened beverage(s) daily.He exercises with enough effort to increase his heart rate 60 or more minutes per day.  He exercises with enough effort to increase his heart rate 6 days per week.   He is taking medications regularly.       Skin tag   Onset/Duration: about 1 year  Description (location/number): 2  right side of head and neck  Accompanying signs and symptoms (pain, redness):  no   History: prior warts:  no   Therapies tried and outcome: none    Growth on right temple, present for about 6 months or so, catches glasses, unsure if it is getting bigger.  Small skin tag on the neck also, right side, catches on his collar. Would like to get removed if able.        Review of Systems   Constitutional, HEENT, cardiovascular, pulmonary, gi and gu systems are negative, except as otherwise noted.      Objective    /66   Pulse 66   Temp 98.7  F (37.1  C) (Oral)   Ht 1.735 m (5' 8.31\")   Wt 95.5 kg (210 lb 9.6 oz)   SpO2 96%   BMI 31.73 kg/m    Body mass index is " 31.73 kg/m .  Physical Exam   GENERAL: healthy, alert and no distress  SKIN: 1 mm skin tag on right lateral neck.  ~4 mm raised papule, dry flaky skin on top.

## 2021-07-15 ENCOUNTER — OFFICE VISIT (OUTPATIENT)
Dept: DERMATOLOGY | Facility: CLINIC | Age: 51
End: 2021-07-15
Payer: COMMERCIAL

## 2021-07-15 DIAGNOSIS — L30.4 INTERTRIGO: ICD-10-CM

## 2021-07-15 DIAGNOSIS — L91.8 SKIN TAG: ICD-10-CM

## 2021-07-15 DIAGNOSIS — L91.8 ACROCHORDON: ICD-10-CM

## 2021-07-15 DIAGNOSIS — L98.9 SKIN LESION: ICD-10-CM

## 2021-07-15 DIAGNOSIS — L82.1 SEBORRHEIC KERATOSES: ICD-10-CM

## 2021-07-15 DIAGNOSIS — B07.9 FILIFORM WART: Primary | ICD-10-CM

## 2021-07-15 PROCEDURE — 11200 RMVL SKIN TAGS UP TO&INC 15: CPT | Mod: XS

## 2021-07-15 PROCEDURE — 99202 OFFICE O/P NEW SF 15 MIN: CPT | Mod: 25

## 2021-07-15 PROCEDURE — 17110 DESTRUCTION B9 LES UP TO 14: CPT | Mod: GC

## 2021-07-15 RX ORDER — CLOTRIMAZOLE 1 %
CREAM (GRAM) TOPICAL 2 TIMES DAILY
Qty: 60 G | Refills: 3 | Status: SHIPPED | OUTPATIENT
Start: 2021-07-15

## 2021-07-15 ASSESSMENT — PAIN SCALES - GENERAL: PAINLEVEL: NO PAIN (0)

## 2021-07-15 NOTE — LETTER
7/15/2021     RE: Clinton Jim  [id number]  [place] [address]     Dear Colleague,    Thank you for referring your patient, Clinton Jim, to the [facility]MATOLOG[facility] [place] [facility]. Please see a copy of my visit note below.    [facility] Dermatology Note  Encounter Date: Jul 15, 2021  Office Visit     Dermatology Problem List:  # Filiform wart, right temple: s/p cryo 7/15  # Axillary rash ?intertrigo: empiric tx clotrimazole cream, woods lamp negative  ____________________________________________  Assessment & Plan:   #Skin tag, right neck and right flank, getting caught on clothing, s/p cryotherapy    #Filiform wart, right temple, s/p cryotherapy, f/u in 3 months    #Axillary rash bilateral: Noted on physical exam today as below, patient had not noticed. Differential includes intertrigo (no satellite lesions noted on exam) vs erythrasma (negative woods lamp) vs less likely inverse psoriasis. Will empirically treat with clotrimazole and f/u in 3 months.     #Seborrheic keratosis: Noted on exam, getting caught on clothing per patient, s/p cryotherapy     Procedures Performed:   - Cryotherapy procedure note, location(s): right neck, flank, right temple, left shoulder, and right calf. After verbal consent and discussion of risks and benefits including, but not limited to, dyspigmentation/scar, blister, and pain, 5 lesion(s) was(were) treated with 1-2 mm freeze border for 1-2 cycles with liquid nitrogen. Post cryotherapy instructions were provided.    Follow-up: 3 months, prn for new or changing lesions    Staff and Resident:   Sung Marks MD  PGY-2 Dermatology  Pager: [phone]    Patient was seen and examined with the dermatology resident. I agree with the history, review of systems, physical examination, assessments and plan. I was present for the entire cryotherapy procedure.    Dorcas Vitale MD  Professor and   Chair  Department of Dermatology  [facility]  ____________________________________________    CC: Derm Problem (pt state he is here to have two skin tags removed, right neck and right side of face. X 1 year)    HPI:  Mr. Clinton Jim is a(n) 51 year old male who presents today as a new patient for lesion(s) of concern.  - Lesion on right temple noticed approimately 1 year ago  - has increased in size, somewhat crusty, nontender, no bleeding  - skin tag on the right neck gets caught on clothing  - No family history of skin cancer  [place]  - Intermittent sunscreen use when outside  - Recalls blistering sunburns as a child   - Patient is otherwise feeling well, without additional skin concerns.    Labs Reviewed:  N/A    Physical Exam:  Vitals: There were no vitals taken for this visit.  SKIN: Focused examination of head, face, neck, trunk, and lower extremities was performed.  - Right temple with 4mm filiform, hyperkeratotic papule with vessels visualized on dermoscopy c/w filiform wart  - Right neck and right flank with tiny flesh colored pedunculated papules c/w acrochordons   - Left shoulder and right calf with waxy stuck on tan/brown papules  - bilateral axilla with well demarcated, brown/hyperpigmented colored plaques; no satellite lesions noted. Negative wood's lamp examination  - No other lesions of concern on areas examined.     Medications:  Current Outpatient Medications   Medication     clotrimazole (LOTRIMIN) 1 % external cream     No current facility-administered medications for this visit.      Past Medical History:   Patient Active Problem List   Diagnosis     CARDIOVASCULAR SCREENING; LDL GOAL LESS THAN 160     Seborrheic keratosis     No past medical history on file.    CC   April Sangita Shafer MD  [address] [place] [address] on close of this encounter.  Sung Marks MD

## 2021-07-15 NOTE — NURSING NOTE
Chief Complaint   Patient presents with     Derm Problem     pt state he is here to have two skin tags removed, right neck and right side of face. X 1 year     Claudia Nixon MA

## 2021-07-15 NOTE — PATIENT INSTRUCTIONS
- Clotrimazole to both armpits once daily     Cryotherapy    What is it?    Use of a very cold liquid, such as liquid nitrogen, to freeze and destroy abnormal skin cells that need to be removed    What should I expect?    Tenderness and redness    A small blister that might grow and fill with dark purple blood. There may be crusting.    More than one treatment may be needed if the lesions do not go away.    How do I care for the treated area?    Gently wash the area with your hands when bathing.    Use a thin layer of Vaseline to help with healing. You may use a Band-Aid.     The area should heal within 7-10 days and may leave behind a pink or lighter color.     Do not use an antibiotic or Neosporin ointment.     You may take acetaminophen (Tylenol) for pain.     Call your Doctor if you have:    Severe pain    Signs of infection (warmth, redness, cloudy yellow drainage, and or a bad smell)    Questions or concerns    Who should I call with questions?       [facility]: [phone]       [facility]: [phone]       For urgent needs outside of business hours call the [facility] at [phone]        and ask for the dermatology resident on call

## 2021-07-15 NOTE — PROGRESS NOTES
[facility] Dermatology Note  Encounter Date: Jul 15, 2021  Office Visit     Dermatology Problem List:  # Filiform wart, right temple: s/p cryo 7/15  # Axillary rash ?intertrigo: empiric tx clotrimazole cream, woods lamp negative  ____________________________________________    Assessment & Plan:   #Skin tag, right neck and right flank, getting caught on clothing, s/p cryotherapy    #Filiform wart, right temple, s/p cryotherapy, f/u in 3 months    #Axillary rash bilateral: Noted on physical exam today as below, patient had not noticed. Differential includes intertrigo (no satellite lesions noted on exam) vs erythrasma (negative woods lamp) vs less likely inverse psoriasis. Will empirically treat with clotrimazole and f/u in 3 months.     #Seborrheic keratosis: Noted on exam, getting caught on clothing per patient, s/p cryotherapy     Procedures Performed:   - Cryotherapy procedure note, location(s): right neck, flank, right temple, left shoulder, and right calf. After verbal consent and discussion of risks and benefits including, but not limited to, dyspigmentation/scar, blister, and pain, 5 lesion(s) was(were) treated with 1-2 mm freeze border for 1-2 cycles with liquid nitrogen. Post cryotherapy instructions were provided.    Follow-up: 3 months, prn for new or changing lesions    Staff and Resident:     Sung Marks MD  PGY-2 Dermatology  Pager: [phone]    Patient was seen and examined with the dermatology resident. I agree with the history, review of systems, physical examination, assessments and plan. I was present for the entire cryotherapy procedure.    Dorcas Vitale MD  Professor and  Chair  Department of Dermatology  [facility]    ____________________________________________    CC: Derm Problem (pt state he is here to have two skin tags removed, right neck and right side of face. X 1 year)    HPI:  Mr. Clinton Jim is a(n) 51 year old male who presents today as a new  patient for lesion(s) of concern.  - Lesion on right temple noticed approimately 1 year ago  - has increased in size, somewhat crusty, nontender, no bleeding  - skin tag on the right neck gets caught on clothing  - No family history of skin cancer  [place]  - Intermittent sunscreen use when outside  - Recalls blistering sunburns as a child   - Patient is otherwise feeling well, without additional skin concerns.    Labs Reviewed:  N/A    Physical Exam:  Vitals: There were no vitals taken for this visit.  SKIN: Focused examination of head, face, neck, trunk, and lower extremities was performed.  - Right temple with 4mm filiform, hyperkeratotic papule with vessels visualized on dermoscopy c/w filiform wart  - Right neck and right flank with tiny flesh colored pedunculated papules c/w acrochordons   - Left shoulder and right calf with waxy stuck on tan/brown papules  - bilateral axilla with well demarcated, brown/hyperpigmented colored plaques; no satellite lesions noted. Negative wood's lamp examination  - No other lesions of concern on areas examined.     Medications:  Current Outpatient Medications   Medication     clotrimazole (LOTRIMIN) 1 % external cream     No current facility-administered medications for this visit.      Past Medical History:   Patient Active Problem List   Diagnosis     CARDIOVASCULAR SCREENING; LDL GOAL LESS THAN 160     Seborrheic keratosis     No past medical history on file.    CC April Sangita Shafer MD  [address] [place] [address] on close of this encounter.

## 2021-09-26 ENCOUNTER — HEALTH MAINTENANCE LETTER (OUTPATIENT)
Age: 51
End: 2021-09-26

## 2021-10-25 NOTE — PROGRESS NOTES
[facility] Dermatology Note  Encounter Date: Oct 28, 2021  Office Visit     Dermatology Problem List:  # Filiform wart, right temple, resolved: s/p cryo 7/15  # Axillary rash, resolved  __________________________________________    Assessment & Plan:   # Inflamed skin tag, s/p LN2 10/28/21     # Filiform wart, right temple, resolved: s/p cryo 7/15    # Axillary rash, resolved  - prior concern for intertrigo recommended empiric treatment with clotrimazole, did not use antifungal, resolved on exam today, likely r/t Deoderant patient was using      Procedures Performed:   - Cryotherapy procedure note, location(s): left shoulder. After verbal consent and discussion of risks and benefits including, but not limited to, dyspigmentation/scar, blister, and pain, 1 lesion(s) was(were) treated with 1-2 mm freeze border for 1-2 cycles with liquid nitrogen. Post cryotherapy instructions were provided.    Follow-up: prn for new or changing lesions    Staff and Resident:     Sung Marks MD  PGY-2 Dermatology  Pager: [phone]    ____________________________________________    CC: Derm Problem (pt states he is here for a fololow up on skin tags on right side, shoulder, neck and right leg )    HPI:  Mr. Clinton Jim is a(n) 51 year old male who presents today as a return patient for wart follow up and rash follow up  - Axillary rash resolved, filiform wart resolved  - has some inflamed skin tags still present  - No other lesion(s) of concern   - Patient is otherwise feeling well, without additional skin concerns.    Labs Reviewed:  N/A    Physical Exam:  Vitals: There were no vitals taken for this visit.  SKIN: Focused examination of neck, axillae, and temples was performed.  - inflamed/irritated flesh colored papule on left shoulder  - axillae clear bilaterally  - right temple without verruca   - No other lesions of concern on areas examined.     Medications:  Current Outpatient Medications   Medication      clotrimazole (LOTRIMIN) 1 % external cream     No current facility-administered medications for this visit.      Past Medical History:   Patient Active Problem List   Diagnosis     CARDIOVASCULAR SCREENING; LDL GOAL LESS THAN 160     Seborrheic keratosis     No past medical history on file.    CC Referred Self, MD  No address on file on close of this encounter.

## 2021-10-28 ENCOUNTER — OFFICE VISIT (OUTPATIENT)
Dept: DERMATOLOGY | Facility: CLINIC | Age: 51
End: 2021-10-28
Payer: COMMERCIAL

## 2021-10-28 DIAGNOSIS — L30.9 DERMATITIS, UNSPECIFIED: ICD-10-CM

## 2021-10-28 DIAGNOSIS — L91.8 INFLAMED SKIN TAG: Primary | ICD-10-CM

## 2021-10-28 PROCEDURE — 11201 RMVL SKIN TAGS EA ADDL 10: CPT | Mod: GC

## 2021-10-28 ASSESSMENT — PAIN SCALES - GENERAL: PAINLEVEL: NO PAIN (0)

## 2021-10-28 NOTE — LETTER
10/28/2021       RE: Clinton Jim  [id number]  [place] [address]     Dear Colleague,    Thank you for referring your patient, Clinton Jim, to the [facility] DERMATOLOGY [facility] [place] at [facility]. Please see a copy of my visit note below.    [facility] Dermatology Note  Encounter Date: Oct 28, 2021  Office Visit     Dermatology Problem List:  # Filiform wart, right temple, resolved: s/p cryo 7/15  # Axillary rash, resolved  __________________________________________    Assessment & Plan:   # Inflamed skin tag, s/p LN2 10/28/21     # Filiform wart, right temple, resolved: s/p cryo 7/15    # Axillary rash, resolved  - prior concern for intertrigo recommended empiric treatment with clotrimazole, did not use antifungal, resolved on exam today, likely r/t Deoderant patient was using      Procedures Performed:   - Cryotherapy procedure note, location(s): left shoulder. After verbal consent and discussion of risks and benefits including, but not limited to, dyspigmentation/scar, blister, and pain, 1 lesion(s) was(were) treated with 1-2 mm freeze border for 1-2 cycles with liquid nitrogen. Post cryotherapy instructions were provided.    Follow-up: prn for new or changing lesions    Staff and Resident:     Sung Marks MD  PGY-2 Dermatology  Pager: [phone]    ____________________________________________    CC: Derm Problem (pt states he is here for a fololow up on skin tags on right side, shoulder, neck and right leg )    HPI:  Mr. Clinton Jim is a(n) 51 year old male who presents today as a return patient for wart follow up and rash follow up  - Axillary rash resolved, filiform wart resolved  - has some inflamed skin tags still present  - No other lesion(s) of concern   - Patient is otherwise feeling well, without additional skin concerns.    Labs Reviewed:  N/A    Physical Exam:  Vitals: There were no vitals taken for this  visit.  SKIN: Focused examination of neck, axillae, and temples was performed.  - inflamed/irritated flesh colored papule on left shoulder  - axillae clear bilaterally  - right temple without verruca   - No other lesions of concern on areas examined.     Medications:  Current Outpatient Medications   Medication     clotrimazole (LOTRIMIN) 1 % external cream     No current facility-administered medications for this visit.      Past Medical History:   Patient Active Problem List   Diagnosis     CARDIOVASCULAR SCREENING; LDL GOAL LESS THAN 160     Seborrheic keratosis     No past medical history on file.    CC Referred Self, MD  No address on file on close of this encounter.    I talked with and examined Clinton Jim and I agree with the assessment and the plan. I was present for the cryotherapy.  . YOLA Madison MD.

## 2021-10-28 NOTE — PATIENT INSTRUCTIONS
Cryotherapy    What is it?    Use of a very cold liquid, such as liquid nitrogen, to freeze and destroy abnormal skin cells that need to be removed    What should I expect?    Tenderness and redness    A small blister that might grow and fill with dark purple blood. There may be crusting.    More than one treatment may be needed if the lesions do not go away.    How do I care for the treated area?    Gently wash the area with your hands when bathing.    Use a thin layer of Vaseline to help with healing. You may use a Band-Aid.     The area should heal within 7-10 days and may leave behind a pink or lighter color.     Do not use an antibiotic or Neosporin ointment.     You may take acetaminophen (Tylenol) for pain.     Call your doctor if you have:    Severe pain    Signs of infection (warmth, redness, cloudy yellow drainage, and or a bad smell)    Questions or concerns    Who should I call with questions?       [facility]: [phone]       [facility]: [phone]       For urgent needs outside of business hours call the [facility] at [phone] and ask for the dermatology resident on call

## 2021-10-28 NOTE — PROGRESS NOTES
I talked with and examined Clinton Jim and I agree with the assessment and the plan. I was present for the cryotherapy.  . YOLA Madison MD.

## 2022-01-15 ENCOUNTER — HEALTH MAINTENANCE LETTER (OUTPATIENT)
Age: 52
End: 2022-01-15

## 2022-12-22 NOTE — Clinical Note
Continuity clinic patient. Please review and sign in order to close chart. Thank you!     Occupational Therapy Evaluation and Treatment.

## 2023-04-23 ENCOUNTER — HEALTH MAINTENANCE LETTER (OUTPATIENT)
Age: 53
End: 2023-04-23

## 2024-06-29 ENCOUNTER — HEALTH MAINTENANCE LETTER (OUTPATIENT)
Age: 54
End: 2024-06-29